# Patient Record
(demographics unavailable — no encounter records)

---

## 2025-06-19 NOTE — CARDIOLOGY SUMMARY
[de-identified] : Normal sinus rhythm [de-identified] : - Hypercholesterolemia: 10-year cardiac risk 0.9% 6/7/2025

## 2025-06-19 NOTE — DISCUSSION/SUMMARY
[FreeTextEntry1] : Cool extremities Normal peripheral pulses.  Normal hemoglobin hematocrit.  Probably secondary to Raynaud's.  The patient was instructed to keep his extremities warm.  Dizziness Orthostasis by history. The patient was instructed to increase his water intake from 32 ounces to 64 ounces daily.  If his dizziness continues despite increasing his water intake, we will then consider further evaluation  Dyspnea on exertion Probably secondary to exercise intolerance. I recommended an echocardiogram to rule out a cardiomyopathy or valvular abnormality as the etiology of his dyspnea on exertion. The patient was instructed to initiate an exercise program. If he were to experience an exacerbation of his symptoms or no improvement with daily exercise, we would then consider further evaluation  RTO as needed [EKG obtained to assist in diagnosis and management of assessed problem(s)] : EKG obtained to assist in diagnosis and management of assessed problem(s)

## 2025-06-19 NOTE — HISTORY OF PRESENT ILLNESS
[FreeTextEntry1] : The patient is a 39-year-old male with an unremarkable past medical history who presents for evaluation of cold extremities, dizziness, and dyspnea. The patient reports that he was evaluated by a cardiologist last week.  He is seeking a second opinion. 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  He reports dyspnea on exertion.  He reports dizziness that occurs when getting out of bed quickly.  He does not exercise.  His cholesterol demonstrated an elevated total cholesterol with an acceptable 10-year cardiac risk of less than 1%.  Past medical history: Hypercholesterolemia Past surgical history: Bilateral inguinal hernias Social history: Non-smoker Family history: No premature CAD

## 2025-06-19 NOTE — REVIEW OF SYSTEMS
[Dyspnea on exertion] : dyspnea during exertion [Dizziness] : dizziness [Negative] : Heme/Lymph [FreeTextEntry9] : Cool extremities